# Patient Record
Sex: MALE | Race: AMERICAN INDIAN OR ALASKA NATIVE
[De-identification: names, ages, dates, MRNs, and addresses within clinical notes are randomized per-mention and may not be internally consistent; named-entity substitution may affect disease eponyms.]

---

## 2021-07-30 NOTE — EMERGENCY DEPARTMENT REPORT
ED Upper Extremity Inj HPI





- General


Chief Complaint: Extremity Injury, Upper


Stated Complaint: LT PINKEY INJURY


Source: patient


Mode of arrival: Ambulatory


Limitations: No Limitations





- History of Present Illness


Initial Comments: 





Patient is a 31-year-old -American male with a history of epilepsy who 

presents to the ED with complaint of acute onset persistent severe left hand 

pain with mild swelling after the air conditioning unit fell onto his left hand 

2 days ago.  Patient states that the pain has been persistent and constant 

especially with any active range of motion or palpation.  Patient denies 

numbness or tingling or weakness of left hand, dizziness, syncope, nausea and 

vomiting, seizures, chest pain or shortness of breath or back pain.


MD Complaint: Injury to:: left (hand pain), hand (left hand injury)


-: Sudden, days(s) (2)


Other Extremity Injury: Hand: Left (pain)


Other Injuries: none


Handedness: right


Place: home


Severity scale (0 -10): 7


Improves With: none


Worsens With: movement of extremity


Context: direct blow (Patient AC unit fell on his left hand), crush, injury


Associated Symptoms: denies other symptoms.  denies: weakness, numbness, neck 

pain, suspects foreign body, nausea/vomiting, heard/felt popping sensat


Treatments Prior to Arrival: cold therapy, NSAIDS





- Related Data


                                  Previous Rx's











 Medication  Instructions  Recorded  Last Taken  Type


 


Ibuprofen [Motrin] 600 mg PO Q8H PRN #30 tablet 21 Unknown Rx











                                    Allergies











Allergy/AdvReac Type Severity Reaction Status Date / Time


 


No Known Allergies Allergy   Unverified 21 20:41














ED Review of Systems


ROS: 


Stated complaint: LT PINKEY INJURY


Other details as noted in HPI





Constitutional: denies: chills, fever


Eyes: denies: eye pain, eye discharge, vision change


ENT: denies: ear pain, throat pain


Respiratory: denies: cough, shortness of breath, wheezing


Cardiovascular: denies: chest pain, palpitations


Endocrine: no symptoms reported


Gastrointestinal: denies: abdominal pain, nausea, diarrhea


Genitourinary: denies: urgency, dysuria


Musculoskeletal: joint swelling (Left hand swelling and pain), arthralgia (Left 

hand pain and swelling).  denies: back pain


Skin: denies: rash, lesions


Neurological: denies: headache, weakness, paresthesias


Psychiatric: denies: anxiety, depression


Hematological/Lymphatic: denies: easy bleeding, easy bruising





ED Past Medical Hx





- Past Medical History


Previous Medical History?: Yes


Additional medical history: epilepsy, heart murmur





- Surgical History


Past Surgical History?: Yes


Additional Surgical History: L hand





- Medications


Home Medications: 


                                Home Medications











 Medication  Instructions  Recorded  Confirmed  Last Taken  Type


 


Ibuprofen [Motrin] 600 mg PO Q8H PRN #30 tablet 21  Unknown Rx














ED Physical Exam





- General


Limitations: No Limitations


General appearance: alert, in no apparent distress





- Head


Head exam: Present: atraumatic, normocephalic, normal inspection





- Eye


Eye exam: Present: normal appearance, PERRL, EOMI


Pupils: Present: normal accommodation





- ENT


ENT exam: Present: normal exam, normal orophraynx, mucous membranes moist, TM's 

normal bilaterally, normal external ear exam





- Neck


Neck exam: Present: normal inspection, full ROM





- Respiratory


Respiratory exam: Present: normal lung sounds bilaterally.  Absent: respiratory 

distress, wheezes, rales, rhonchi, chest wall tenderness, accessory muscle use





- Cardiovascular


Cardiovascular Exam: Present: regular rate, normal rhythm, normal heart sounds. 

 Absent: systolic murmur, diastolic murmur, rubs, gallop





- GI/Abdominal


GI/Abdominal exam: Present: soft, normal bowel sounds.  Absent: tenderness, 

guarding, rebound, hyperactive bowel sounds, hypoactive bowel sounds, 

organomegaly





- Extremities Exam


Extremities exam: Present: normal inspection, full ROM, tenderness (Palpable 

left hand tenderness), normal capillary refill.  Absent: pedal edema, joint 

swelling, calf tenderness





- Back Exam


Back exam: Present: normal inspection, full ROM.  Absent: tenderness, CVA 

tenderness (R), CVA tenderness (L), muscle spasm





- Neurological Exam


Neurological exam: Present: alert, oriented X3, CN II-XII intact, normal gait, 

reflexes normal





- Psychiatric


Psychiatric exam: Present: normal affect, normal mood





- Skin


Skin exam: Present: warm, dry, intact, normal color.  Absent: rash





ED Course





                                   Vital Signs











  21





  20:39


 


Temperature 98.5 F


 


Pulse Rate 62


 


Respiratory 16





Rate 


 


Blood Pressure 116/55





[Right] 


 


O2 Sat by Pulse 97





Oximetry 














ED Medical Decision Making





- Radiology Data


Radiology results: report reviewed, image reviewed





[facility]  


                                     [address] [place] [address]  


 


                                            XRay Report   


                                               Signed  


 


Patient: STEPHANI BARKSDALE                                                   

             MR#: [id number]  


[id number]          


: 1990                                                                

Acct:[id number]      


 


Age/Sex: 31 / M                                                                

ADM Date: 21     


 


Loc: ED       


Attending Dr:   


 


 


Ordering Physician: DUTCH RUVALCABA  


Date of Service: 21  


Procedure(s): XR hand 3+V LT  


Accession Number(s): [id number]  


 


cc: DUTCH RUVALCABA   


 


Fluoro Time In Minutes:   


 


LEFT HAND 4 VIEWS  


 


 INDICATION:  


 L hand pain.  


 


 COMPARISON:  


 No relevant prior imaging study available.  


 


 FINDINGS:  


 No acute fracture is seen. There is posttraumatic deformity of the proximal 

phalanx of the ring 


finger.  


 


 There is mild soft tissue swelling along the ulnar aspect of the hand near the 

little finger MCP 


joint. No foreign bodies.  


 


 IMPRESSION:  


 1. No acute skeletal abnormality. There is soft tissue swelling along the ulnar

 aspect of the hand.  


 


 


 


 Signer Name: Brenden Vázquez MD   


 Signed: 2021 9:42 PM  


 Workstation Name: [id number]   


 


 


Transcribed By: SINTIA  


Dictated By: Brenden Vázquez MD  


Electronically Authenticated By: Brenden Vázquez MD    


Signed Date/Time: 21                                


 


 


 


DD/DT: 21                                                            

  


TD/TT:





























- Medical Decision Making





This is a 31-year-old -American male with a history of epilepsy who 

presents to the ED with complaint of acute onset persistent severe left hand 

pain with mild swelling after the air conditioning unit fell onto his left hand 

2 days ago.  Patient states that the pain has been persistent and constant 

especially with any active range of motion or palpation.  In the ED, patient is 

alert and oriented x3 and is not in any distress.  Patient is hemodynamically 

stable.  The left hand x-ray showed no acute fractures or subluxations but soft 

tissue swelling.  Patient was therefore discharged home on pain medications and 

advised to follow-up with his primary care physician in 5 to 7 days for 

reevaluation or return to the ED immediately if symptoms get worse.





- Differential Diagnosis


Hand fracture; hand contusion; hand sprain;


Critical care attestation.: 


If time is entered above; I have spent that time in minutes in the direct care 

of this critically ill patient, excluding procedure time.








ED Disposition


Clinical Impression: 


Contusion of left hand including fingers


Qualifiers:


 Encounter type: initial encounter Qualified Code(s): S60.222A - Contusion of 

left hand, initial encounter; S60.00XA - Contusion of unspecified finger without

 damage to nail, initial encounter





Sprain of left hand


Qualifiers:


 Encounter type: initial encounter Qualified Code(s): S63.92XA - Sprain of 

unspecified part of left wrist and hand, initial encounter





Disposition:  TO HOME OR SELFCARE


Is pt being admited?: No


Does the pt Need Aspirin: No


Condition: Stable


Instructions:  Hand Contusion, Easy-to-Read, Intermetacarpal Sprain


Additional Instructions: 


The left hand x-ray showed no acute fractures or subluxations but mild soft 

tissue swelling.  Your injuries most likely musculoskeletal due to the crash on 

your left hand.  Therefore take pain medication as needed with food, drink 

plenty of fluids and follow-up with your primary care physician in 7 to 10 days 

for reevaluation.  Return to the ED immediately if symptoms get worse.


Prescriptions: 


Ibuprofen [Motrin] 600 mg PO Q8H PRN #30 tablet


 PRN Reason: Pain


Referrals: 


[facility] [Provider Group] - 3-5 Days


Time of Disposition: 21:55


Print Language: ENGLISH

## 2021-07-30 NOTE — XRAY REPORT
LEFT HAND 4 VIEWS



INDICATION:

L hand pain.



COMPARISON:

No relevant prior imaging study available.



FINDINGS:

No acute fracture is seen. There is posttraumatic deformity of the proximal phalanx of the ring finge
r.



There is mild soft tissue swelling along the ulnar aspect of the hand near the little finger MCP join
t. No foreign bodies.



IMPRESSION:

1. No acute skeletal abnormality. There is soft tissue swelling along the ulnar aspect of the hand.







Signer Name: Brenden Vázquez MD 

Signed: 7/30/2021 9:42 PM

Workstation Name: [id number]

## 2022-05-18 ENCOUNTER — HOSPITAL ENCOUNTER (EMERGENCY)
Dept: HOSPITAL 5 - ED | Age: 32
Discharge: HOME | End: 2022-05-18
Payer: SELF-PAY

## 2022-05-18 VITALS — SYSTOLIC BLOOD PRESSURE: 132 MMHG | DIASTOLIC BLOOD PRESSURE: 83 MMHG

## 2022-05-18 DIAGNOSIS — G40.909: Primary | ICD-10-CM

## 2022-05-18 DIAGNOSIS — Z79.899: ICD-10-CM

## 2022-05-18 LAB
ALBUMIN SERPL-MCNC: 4.8 G/DL (ref 3.9–5)
ALT SERPL-CCNC: 23 UNITS/L (ref 7–56)
APTT BLD: 28.5 SEC. (ref 24.2–36.6)
BASOPHILS # (AUTO): 0.1 K/MM3 (ref 0–0.1)
BASOPHILS NFR BLD AUTO: 1.1 % (ref 0–1.8)
BILIRUB UR QL STRIP: (no result)
BLOOD UR QL VISUAL: (no result)
BUN SERPL-MCNC: 15 MG/DL (ref 9–20)
BUN/CREAT SERPL: 15 %
CALCIUM SERPL-MCNC: 9.1 MG/DL (ref 8.4–10.2)
EOSINOPHIL # BLD AUTO: 0.2 K/MM3 (ref 0–0.4)
EOSINOPHIL NFR BLD AUTO: 4.1 % (ref 0–4.3)
HCT VFR BLD CALC: 45.3 % (ref 35.5–45.6)
HEMOLYSIS INDEX: 4
HGB BLD-MCNC: 14.9 GM/DL (ref 11.8–15.2)
HYALINE CASTS #/AREA URNS LPF: 1 /LPF
INR PPP: 0.85 (ref 0.87–1.13)
LYMPHOCYTES # BLD AUTO: 2.8 K/MM3 (ref 1.2–5.4)
LYMPHOCYTES NFR BLD AUTO: 46.1 % (ref 13.4–35)
MCHC RBC AUTO-ENTMCNC: 33 % (ref 32–34)
MCV RBC AUTO: 103 FL (ref 84–94)
MONOCYTES # (AUTO): 0.6 K/MM3 (ref 0–0.8)
MONOCYTES % (AUTO): 9.5 % (ref 0–7.3)
MUCOUS THREADS #/AREA URNS HPF: (no result) /HPF
PH UR STRIP: 5 [PH] (ref 5–7)
PLATELET # BLD: 309 K/MM3 (ref 140–440)
RBC # BLD AUTO: 4.41 M/MM3 (ref 3.65–5.03)
RBC #/AREA URNS HPF: 1 /HPF (ref 0–6)
URATE SERPL-MCNC: 5.9 MG/DL (ref 3.5–7.6)
UROBILINOGEN UR-MCNC: < 2 MG/DL (ref ?–2)
WBC #/AREA URNS HPF: 1 /HPF (ref 0–6)

## 2022-05-18 PROCEDURE — G0480 DRUG TEST DEF 1-7 CLASSES: HCPCS

## 2022-05-18 PROCEDURE — 85025 COMPLETE CBC W/AUTO DIFF WBC: CPT

## 2022-05-18 PROCEDURE — 36415 COLL VENOUS BLD VENIPUNCTURE: CPT

## 2022-05-18 PROCEDURE — 85730 THROMBOPLASTIN TIME PARTIAL: CPT

## 2022-05-18 PROCEDURE — 99284 EMERGENCY DEPT VISIT MOD MDM: CPT

## 2022-05-18 PROCEDURE — 80320 DRUG SCREEN QUANTALCOHOLS: CPT

## 2022-05-18 PROCEDURE — 96365 THER/PROPH/DIAG IV INF INIT: CPT

## 2022-05-18 PROCEDURE — 83880 ASSAY OF NATRIURETIC PEPTIDE: CPT

## 2022-05-18 PROCEDURE — 96361 HYDRATE IV INFUSION ADD-ON: CPT

## 2022-05-18 PROCEDURE — 83690 ASSAY OF LIPASE: CPT

## 2022-05-18 PROCEDURE — 84550 ASSAY OF BLOOD/URIC ACID: CPT

## 2022-05-18 PROCEDURE — 81001 URINALYSIS AUTO W/SCOPE: CPT

## 2022-05-18 PROCEDURE — 80307 DRUG TEST PRSMV CHEM ANLYZR: CPT

## 2022-05-18 PROCEDURE — 85610 PROTHROMBIN TIME: CPT

## 2022-05-18 PROCEDURE — 80177 DRUG SCRN QUAN LEVETIRACETAM: CPT

## 2022-05-18 PROCEDURE — 84443 ASSAY THYROID STIM HORMONE: CPT

## 2022-05-18 PROCEDURE — 84484 ASSAY OF TROPONIN QUANT: CPT

## 2022-05-18 PROCEDURE — 82140 ASSAY OF AMMONIA: CPT

## 2022-05-18 PROCEDURE — 93005 ELECTROCARDIOGRAM TRACING: CPT

## 2022-05-18 PROCEDURE — 80053 COMPREHEN METABOLIC PANEL: CPT

## 2022-05-18 NOTE — EMERGENCY DEPARTMENT REPORT
ED Seizure HPI





- General


Chief Complaint: Seizure


Stated Complaint: SEIZURE


Time Seen by Provider: 05/18/22 07:29


Source: patient


Mode of arrival: Stretcher


Limitations: No Limitations





- History of Present Illness


Initial Comments: 





31-year-old male with a history of seizure currently on Dilantin and Keppra 

brought in by EMS with an episode of seizure this morning.  Patient states that 

he has been having episodes of seizure the last 4 days.  He reported that he has

been taking her medication and with supposed to.  He follow-up with neurology.  

He mentioned that he did have some couple of shots/alcohol last night before 

going to bed around 1 AM.  Patient denies any symptoms at this point other than 

being a little tired.  Patient denies any illicit drug use except the alcohol.  

No other modifying or positive factors reported.





- Related Data


                                Home Medications











 Medication  Instructions  Recorded  Confirmed  Last Taken


 


Dilantin 300 mg PO QDAY 05/18/22 05/18/22 Unknown


 


Keppra 500 mg PO QDAY 05/18/22 05/18/22 Unknown











                                    Allergies











Allergy/AdvReac Type Severity Reaction Status Date / Time


 


No Known Allergies Allergy   Verified 05/18/22 07:18














ED Review of Systems


ROS: 


Stated complaint: SEIZURE


Other details as noted in HPI





Comment: All other systems reviewed and negative


Neurological: weakness, other (Seizure)





ED Past Medical Hx





- Past Medical History


Hx Seizures: Yes


Additional medical history: epilepsy, heart murmur





- Surgical History


Additional Surgical History: L hand





- Medications


Home Medications: 


                                Home Medications











 Medication  Instructions  Recorded  Confirmed  Last Taken  Type


 


Dilantin 300 mg PO QDAY 05/18/22 05/18/22 Unknown History


 


Keppra 500 mg PO QDAY 05/18/22 05/18/22 Unknown History














ED Physical Exam





- General


Limitations: No Limitations


General appearance: alert, in no apparent distress





- Head


Head exam: Present: atraumatic, normal inspection





- Eye


Eye exam: Present: normal appearance


Pupils: Present: normal accommodation





- ENT


ENT exam: Present: normal exam, normal orophraynx, mucous membranes dry





- Neck


Neck exam: Present: normal inspection, full ROM.  Absent: tenderness, 

meningismus





- Respiratory


Respiratory exam: Present: normal lung sounds bilaterally.  Absent: respiratory 

distress, accessory muscle use





- Cardiovascular


Cardiovascular Exam: Present: regular rate, normal rhythm, normal heart sounds





- GI/Abdominal


GI/Abdominal exam: Present: soft, normal bowel sounds.  Absent: distended, 

tenderness





- Extremities Exam


Extremities exam: Present: normal inspection, full ROM, normal capillary refill.

 Absent: tenderness





- Back Exam


Back exam: Absent: tenderness





- Neurological Exam


Neurological exam: Present: alert, oriented X3





- Psychiatric


Psychiatric exam: Present: normal affect, normal mood





- Skin


Skin exam: Present: warm, normal color





ED Course


                                   Vital Signs











  05/18/22





  07:18


 


Temperature 98.4 F


 


Pulse Rate 74


 


Blood Pressure 133/83





[Left] 


 


O2 Sat by Pulse 98





Oximetry 














- Reevaluation(s)


Reevaluation #1: 





05/18/22 07:36


With an episode of seizure with a history of seizure--we will go ahead and check

 routine labs to rule out any infectious or electrolyte abnormality.  In the 

meantime we will give IV fluids for hydration.


Reevaluation #2: 





05/18/22 07:36


We also check Keppra/Dilantin level for any need for dose adjustment


Reevaluation #3: 





05/18/22 11:51


Lab reviewed to be within normal limit except slightly elevated lactic acid at 

2.2 that is likely as a result of postictal--patient is hydrated with 1 L of IV 

fluids and received 1 g of Keppra we will discharge patient home to close 

follow-up with--his neurology and warning to return to emergency room if 

symptoms recur or worsen





ED Medical Decision Making





- Lab Data


Result diagrams: 


                                 05/18/22 07:47





                                 05/18/22 07:47





- EKG Data


-: EKG Interpreted by Me


EKG shows normal: sinus rhythm


Rate: bradycardia





- EKG Data


Interpretation: other (No obvious ST elevation noted)


Critical care attestation.: 


If time is entered above; I have spent that time in minutes in the direct care 

of this critically ill patient, excluding procedure time.








ED Disposition


Clinical Impression: 


 Seizure





Disposition: 01 HOME / SELF CARE / HOMELESS


Is pt being admited?: No


Does the pt Need Aspirin: No


Condition: Stable


Instructions:  Seizure, Adult, Easy-to-Read, Managing Non-Epileptic Seizures, 

Adult


Additional Instructions: 


Continue your seizure medication as prescribed by your neurology





Please do not hesitate to call or return to emergency room if  your symptoms 

worsen





Call and schedule follow-up with your primary doctor in the next 3 to 5 days for

 progress


Referrals: 


PRIMARY CARE,MD [Primary Care Provider] - 3-5 Days


Forms:  AMA Form, Accompanied Note, Work/School Release Form(ED)


Time of Disposition: 11:59

## 2022-05-18 NOTE — ELECTROCARDIOGRAPH REPORT
[facility]

                                       

Test Date:    2022               Test Time:    09:34:32

Pat Name:     STEPHANI BARKSDALE             Department:   

Patient ID:   [id number]          Room:          

Gender:       M                        Technician:   PAULINA

:          1990               Requested By: LEONEL ZARATE

Order Number: [id number]              Reading MD:   Bennie Segura

                                 Measurements

Intervals                              Axis          

Rate:         50                       P:            50

ID:           [id number]                      QRS:          77

QRSD:         76                       T:            59

QT:           462                                    

QTc:          420                                    

                           Interpretive Statements

Sinus bradycardia

ST elevation suggests early repolarization , acute pericarditis

No previous ECG available for comparison

Electronically Signed On 2022 11:48:28 EDT by Bennie Segura

## 2022-06-22 ENCOUNTER — HOSPITAL ENCOUNTER (EMERGENCY)
Dept: HOSPITAL 5 - ED | Age: 32
Discharge: HOME | End: 2022-06-22
Payer: SELF-PAY

## 2022-06-22 VITALS — SYSTOLIC BLOOD PRESSURE: 116 MMHG | DIASTOLIC BLOOD PRESSURE: 59 MMHG

## 2022-06-22 DIAGNOSIS — Z79.899: ICD-10-CM

## 2022-06-22 DIAGNOSIS — F17.200: ICD-10-CM

## 2022-06-22 DIAGNOSIS — R56.9: Primary | ICD-10-CM

## 2022-06-22 DIAGNOSIS — R10.13: ICD-10-CM

## 2022-06-22 LAB
ALBUMIN SERPL-MCNC: 4.6 G/DL (ref 3.9–5)
ALT SERPL-CCNC: 19 UNITS/L (ref 7–56)
BAND NEUTROPHILS # (MANUAL): 0 K/MM3
BUN SERPL-MCNC: 10 MG/DL (ref 9–20)
BUN/CREAT SERPL: 10 %
CALCIUM SERPL-MCNC: 9.5 MG/DL (ref 8.4–10.2)
HCT VFR BLD CALC: 40.2 % (ref 35.5–45.6)
HEMOLYSIS INDEX: 11
HGB BLD-MCNC: 13.7 GM/DL (ref 11.8–15.2)
MCHC RBC AUTO-ENTMCNC: 34 % (ref 32–34)
MCV RBC AUTO: 100 FL (ref 84–94)
MYELOCYTES # (MANUAL): 0 K/MM3
PLATELET # BLD: 217 K/MM3 (ref 140–440)
PROMYELOCYTES # (MANUAL): 0 K/MM3
RBC # BLD AUTO: 4.01 M/MM3 (ref 3.65–5.03)
TOTAL CELLS COUNTED BLD: 100

## 2022-06-22 PROCEDURE — 80177 DRUG SCRN QUAN LEVETIRACETAM: CPT

## 2022-06-22 PROCEDURE — 96375 TX/PRO/DX INJ NEW DRUG ADDON: CPT

## 2022-06-22 PROCEDURE — 80185 ASSAY OF PHENYTOIN TOTAL: CPT

## 2022-06-22 PROCEDURE — 85025 COMPLETE CBC W/AUTO DIFF WBC: CPT

## 2022-06-22 PROCEDURE — 96374 THER/PROPH/DIAG INJ IV PUSH: CPT

## 2022-06-22 PROCEDURE — 96361 HYDRATE IV INFUSION ADD-ON: CPT

## 2022-06-22 PROCEDURE — 80053 COMPREHEN METABOLIC PANEL: CPT

## 2022-06-22 PROCEDURE — 36415 COLL VENOUS BLD VENIPUNCTURE: CPT

## 2022-06-22 PROCEDURE — 74176 CT ABD & PELVIS W/O CONTRAST: CPT

## 2022-06-22 PROCEDURE — 85007 BL SMEAR W/DIFF WBC COUNT: CPT

## 2022-06-22 PROCEDURE — 99284 EMERGENCY DEPT VISIT MOD MDM: CPT

## 2022-06-22 PROCEDURE — 93005 ELECTROCARDIOGRAM TRACING: CPT

## 2022-06-22 NOTE — CAT SCAN REPORT
CT ABDOMEN AND PELVIS WITHOUT CONTRAST



INDICATION / CLINICAL INFORMATION: L.L.Q. / Epigastric Abdominal Pain.



TECHNIQUE: Axial CT images were obtained through the abdomen and pelvis without IV contrast.  All CT 
scans at this location are performed using CT dose reduction for ALARA by means of automated exposure
 control. 



COMPARISON: None available.



FINDINGS:



LOWER CHEST: No significant abnormality of the imaged chest.

LIVER: No focal lesion. No acute findings.

GALLBLADDER / BILE DUCTS: No significant abnormality.  Biliary ducts grossly unremarkable.

SPLEEN: No significant abnormality.

PANCREAS: No significant abnormality.

ADRENALS: No significant abnormality.

KIDNEYS/URETERS: 1 mm stone anterior calyx mid right kidney. Kidneys and ureters are otherwise unrema
rkable. No additional urolithiasis.



STOMACH / DUODENUM / SMALL BOWEL: The stomach, duodenum, and small bowel demonstrate no significant a
bnormality. No specific abnormality of the mesentery demonstrated. 

COLON: No significant abnormality. 

APPENDIX: No significant abnormality.  

PERITONEUM: No free air or free fluid are present within the abdomen or pelvis.

LYMPH NODES: No significant adenopathy.

AORTA / ARTERIES: No significant abnormality. 

IVC / VEINS: No significant abnormality.



URINARY BLADDER: No significant abnormality.

REPRODUCTIVE ORGANS: No significant abnormality.



ADDITIONAL ABDOMINAL/PELVIC FINDINGS: None.



SKELETAL SYSTEM: No significant abnormality.





IMPRESSION:

1. No acute findings within the abdomen or pelvis. 



Signer Name: Mathieu Adrian II, MD 

Signed: 6/22/2022 4:24 AM

Workstation Name: [id number]

## 2022-06-22 NOTE — EMERGENCY DEPARTMENT REPORT
ED Seizure HPI





- General


Chief Complaint: Seizure


Stated Complaint: SEIZURES


Time Seen by Provider: 06/22/22 03:18


Source: patient


Mode of arrival: Stretcher


Limitations: No Limitations





- History of Present Illness


Initial Comments: 





32-year-old male with a history of seizure who now presents with an episode 

alcoholic this morning.  Patient also reported epigastric and left lower 

abdominal discomfort associated with nausea and vomiting.  No fever or chills 

reported.  Patient takes Keppra and Dilantin for seizure and reports compliant. 

No other modifying or associated factors reported.





- Related Data


                                Home Medications











 Medication  Instructions  Recorded  Confirmed  Last Taken


 


Dilantin 300 mg PO QDAY 05/18/22 05/18/22 Unknown


 


Keppra 500 mg PO QDAY 05/18/22 05/18/22 Unknown








                                  Previous Rx's











 Medication  Instructions  Recorded  Last Taken  Type


 


Ondansetron [Zofran Odt] 4 mg PO Q8HR 5 Days #15 tab.rapdis 06/22/22 Unknown Rx





 NS   











                                    Allergies











Allergy/AdvReac Type Severity Reaction Status Date / Time


 


No Known Allergies Allergy   Verified 05/18/22 07:18














ED Review of Systems


ROS: 


Stated complaint: SEIZURES


Other details as noted in HPI





Comment: All other systems reviewed and negative


Gastrointestinal: abdominal pain, nausea, vomiting


Neurological: other (Seizure)





ED Past Medical Hx





- Past Medical History


Hx Seizures: Yes


Additional medical history: epilepsy, heart murmur





- Surgical History


Additional Surgical History: L hand





- Social History


Smoking Status: Current Some Day Smoker


Substance Use Type: Marijuana





- Medications


Home Medications: 


                                Home Medications











 Medication  Instructions  Recorded  Confirmed  Last Taken  Type


 


Dilantin 300 mg PO QDAY 05/18/22 05/18/22 Unknown History


 


Keppra 500 mg PO QDAY 05/18/22 05/18/22 Unknown History


 


Ondansetron [Zofran Odt] 4 mg PO Q8HR 5 Days #15 tab.rapdis 06/22/22  Unknown Rx





 NS    














ED Physical Exam





- General


Limitations: No Limitations


General appearance: alert, in no apparent distress





- Head


Head exam: Present: normal inspection





- Eye


Eye exam: Present: normal appearance


Pupils: Present: normal accommodation





- ENT


ENT exam: Present: normal exam, normal orophraynx, mucous membranes moist





- Neck


Neck exam: Present: normal inspection, full ROM.  Absent: tenderness





- Respiratory


Respiratory exam: Present: normal lung sounds bilaterally.  Absent: respiratory 

distress, accessory muscle use





- Cardiovascular


Cardiovascular Exam: Present: regular rate, normal rhythm, normal heart sounds





- GI/Abdominal


GI/Abdominal exam: Present: soft, tenderness (Epigastric and left lower quadrant

tenderness), normal bowel sounds.  Absent: distended





- Extremities Exam


Extremities exam: Present: normal inspection, normal capillary refill.  Absent: 

tenderness, pedal edema





- Back Exam


Back exam: Present: normal inspection.  Absent: tenderness





- Neurological Exam


Neurological exam: Present: alert, oriented X3





- Psychiatric


Psychiatric exam: Present: normal affect, normal mood





- Skin


Skin exam: Present: warm, normal color





ED Course


                                   Vital Signs











  06/22/22 06/22/22 06/22/22





  02:40 02:49 02:50


 


Temperature 99.2 F  99.6 F


 


Pulse Rate 80  59 L


 


Respiratory 16 22 16





Rate   


 


Blood Pressure   132/69


 


Blood Pressure 136/75  132/69





[Right]   


 


O2 Sat by Pulse 99  99





Oximetry   














  06/22/22 06/22/22 06/22/22





  03:01 03:15 03:31


 


Temperature   


 


Pulse Rate 56 L 69 56 L


 


Respiratory 8 L 11 L 27 H





Rate   


 


Blood Pressure 132/69 132/69 132/69


 


Blood Pressure   





[Right]   


 


O2 Sat by Pulse 97 99 98





Oximetry   














  06/22/22 06/22/22 06/22/22





  03:45 04:01 04:38


 


Temperature   


 


Pulse Rate 57 L 70 


 


Respiratory 25 H 21 17





Rate   


 


Blood Pressure 132/69 132/79 132/79


 


Blood Pressure   





[Right]   


 


O2 Sat by Pulse 97 98 100





Oximetry   














  06/22/22





  04:45


 


Temperature 


 


Pulse Rate 56 L


 


Respiratory 10 L





Rate 


 


Blood Pressure 132/79


 


Blood Pressure 





[Right] 


 


O2 Sat by Pulse 99





Oximetry 














ED Medical Decision Making





- Lab Data


Result diagrams: 


                                 06/22/22 03:52





                                 06/22/22 03:52





- Medical Decision Making





Here with seizure associated with nausea and non bloody emesis with abdominal 

pain--differential could be but not limited to appendicitis, diverticulitis, 

cholecystitis, cholelithiasis, nephrolithiasis, gastritis, pancreatitis, 

duodenitis, colitis, irritable bowel syndrome, cystitis, so in order to rule 

this out we will go ahead and order routine acute abdomen that include CBC, CMP,

 urinalysis, and CT imaging of the abdomen/pelvic.





Labs reviewed to be with in normal limit-- CT abd/pel with no acute findings -- 

pt reassured to f/u with PCP and his neurologist -- 





Critical care attestation.: 


If time is entered above; I have spent that time in minutes in the direct care 

of this critically ill patient, excluding procedure time.








ED Disposition


Clinical Impression: 


 Epigastric pain, Seizure





Abdominal pain


Qualifiers:


 Abdominal location: left lower quadrant Qualified Code(s): R10.32 - Left lower 

quadrant pain





Disposition: 01 HOME / SELF CARE / HOMELESS


Is pt being admited?: No


Does the pt Need Aspirin: No


Condition: Stable


Instructions:  Abdominal Pain, Adult, Easy-to-Read, Seizure, Adult, Easy-to-Read


Additional Instructions: 


Increase your daily fluid to help your hydration





Take your Zofran for nausea or vomiting





Call and schedule follow-up with your neurologist/primary doctor in the next 3 

to 5 days for progress





Please do not hesitate to call or return to emergency room if your symptoms 

worsen


Prescriptions: 


Ondansetron [Zofran Odt] 4 mg PO Q8HR 5 Days #15 tab.rapdis NS


Referrals: 


ANNI AGUIRRE MD [Primary Care Provider] - 3-5 Days


Time of Disposition: 05:05

## 2022-06-23 NOTE — ELECTROCARDIOGRAPH REPORT
[facility]

                                       

Test Date:    2022               Test Time:    03:00:29

Pat Name:     STEPHANI BARKSDALE             Department:   

Patient ID:   [id number]          Room:          

Gender:       M                        Technician:   JESSIKA

:          1990               Requested By: LEONEL ZARATE

Order Number: [id number]              Reading MD:   Gelacio Gramajo

                                 Measurements

Intervals                              Axis          

Rate:         56                       P:            37

AZ:           113                      QRS:          49

QRSD:         91                       T:            33

QT:           414                                    

QTc:          398                                    

                           Interpretive Statements

Sinus bradycardia

Otherwise normal ECG

Compared to ECG 2022 09:34:32

No significant change

Electronically Signed On 2022 19:02:18 EDT by Gelacio Gramajo